# Patient Record
Sex: FEMALE | Race: WHITE | NOT HISPANIC OR LATINO | Employment: FULL TIME | URBAN - METROPOLITAN AREA
[De-identification: names, ages, dates, MRNs, and addresses within clinical notes are randomized per-mention and may not be internally consistent; named-entity substitution may affect disease eponyms.]

---

## 2018-08-27 ENCOUNTER — TRANSCRIBE ORDERS (OUTPATIENT)
Dept: PHYSICAL THERAPY | Facility: CLINIC | Age: 48
End: 2018-08-27

## 2018-08-27 DIAGNOSIS — M54.5 LOW BACK PAIN, UNSPECIFIED BACK PAIN LATERALITY, UNSPECIFIED CHRONICITY, WITH SCIATICA PRESENCE UNSPECIFIED: Primary | ICD-10-CM

## 2018-09-06 ENCOUNTER — TREATMENT (OUTPATIENT)
Dept: PHYSICAL THERAPY | Facility: CLINIC | Age: 48
End: 2018-09-06

## 2018-09-06 DIAGNOSIS — S39.012D STRAIN OF LUMBAR REGION, SUBSEQUENT ENCOUNTER: Primary | ICD-10-CM

## 2018-09-06 PROCEDURE — 97110 THERAPEUTIC EXERCISES: CPT | Performed by: PHYSICAL THERAPIST

## 2018-09-06 PROCEDURE — 97161 PT EVAL LOW COMPLEX 20 MIN: CPT | Performed by: PHYSICAL THERAPIST

## 2018-09-06 PROCEDURE — 97112 NEUROMUSCULAR REEDUCATION: CPT | Performed by: PHYSICAL THERAPIST

## 2018-09-06 NOTE — PROGRESS NOTES
Physical Therapy Initial Evaluation and Plan of Care        Subjective Evaluation    History of Present Illness  Date of onset: 8/15/2018  Mechanism of injury: Patient was pulling a can.      Patient Occupation: [employer]   Precautions and Work Restrictions: as toleratedPain  Current pain ratin  At worst pain ratin  Location: bilateral lumbar spine to thighs  Quality: sharp and dull ache (stiffness, pressure)  Relieving factors: rest and ice  Exacerbated by: twisting, reaching out.  Progression: improved             Objective       Palpation     Additional Palpation Details  No TTP to the lumbar spine    Active Range of Motion     Lumbar   Flexion: 81 degrees   Extension: 13 degrees   Left lateral flexion: 25 degrees   Right lateral flexion: 25 degrees     Tests     Additional Tests Details  - PPU  + SLR on the left for LBP  - GWEN         Assessment & Plan     Assessment  Impairments: abnormal or restricted ROM, activity intolerance, lacks appropriate home exercise program and pain with function  Assessment details: Patient presents with c/o pain, limited AROM and positive special testing which is limiting her ability to perform full job duties and ADL'S.  Barriers to therapy: none  Prognosis: good  Prognosis details: STG's to be met by 2 weeks  1)  Independent with HEP  2)  Decrease pain by 50% or more  3)  AROM WNL for the lumbar spine      LTG's to be met by 4 weeks  1)  Independent with HEP progression  2)  Decrease pain by 75% or more  3)  Negative special testing  4)  Patient to perform ADL'S such as vacuuming, cleaning, etc without increase lumbar pain  6)  Patient to push/pull up to 100 lbs without c/o      Plan  Therapy options: will be seen for skilled physical therapy services  Planned therapy interventions: strengthening, stretching, therapeutic activities, home exercise program and neuromuscular re-education  Duration in visits: 12  Treatment plan discussed with: patient        Manual Therapy:    0      mins  82239;  Therapeutic Exercise:    13     mins  47212;     Neuromuscular Caridad:    8    mins  51001;    Therapeutic Activity:     0     mins  67573;     Gait Trainin     mins  37088;     Ultrasound:     0     mins  58817;    Work Hardening           0      mins 03578  Iontophoresis               0   mins 10065    Timed Treatment:   21   mins   Total Treatment:     35   mins    PT SIGNATURE: Jose BASURTOVeena Dmitry, PT   DATE TREATMENT INITIATED: 2018    Initial Certification  Certification Period: 2018  I certify that the therapy services are furnished while this patient is under my care.  The services outlined above are required by this patient, and will be reviewed every 90 days.     PHYSICIAN: Sangeeta Granado, ROBB      DATE:     Please sign and return via fax to [phone].. Thank you, [facility].

## 2018-09-07 ENCOUNTER — TREATMENT (OUTPATIENT)
Dept: PHYSICAL THERAPY | Facility: CLINIC | Age: 48
End: 2018-09-07

## 2018-09-07 DIAGNOSIS — S39.012D STRAIN OF LUMBAR REGION, SUBSEQUENT ENCOUNTER: Primary | ICD-10-CM

## 2018-09-07 PROCEDURE — 97110 THERAPEUTIC EXERCISES: CPT | Performed by: PHYSICAL THERAPIST

## 2018-09-07 PROCEDURE — 97112 NEUROMUSCULAR REEDUCATION: CPT | Performed by: PHYSICAL THERAPIST

## 2018-09-07 NOTE — PROGRESS NOTES
Physical Therapy Daily Progress Note           Subjective  Patient reports a catch in the tailbone.    Objective   See Exercise, Manual, and Modality Logs for complete treatment.       Assessment/Plan  Patient tolerated the progression of ROM and initiation of strengthening exercises without c/o pain in the lumbar spine.  Added ROM exercises for the thoracic spine today to improve the mobility there.  Plan to progress the core stabilization activities as she can tolerate.                     Manual Therapy:    0     mins  24900;  Therapeutic Exercise:    24     mins  63662;     Neuromuscular Caridad:    8    mins  21885;    Therapeutic Activity:     0     mins  28007;     Gait Trainin     mins  02671;     Ultrasound:     0     mins  93965;    Work Hardening           0      mins 63348  Iontophoresis               0   mins 13678    Timed Treatment:   32   mins   Total Treatment:     32   mins    Jose Andres, PT  Physical Therapist

## 2018-09-27 ENCOUNTER — DOCUMENTATION (OUTPATIENT)
Dept: PHYSICAL THERAPY | Facility: CLINIC | Age: 48
End: 2018-09-27

## 2018-09-27 NOTE — PROGRESS NOTES
Discharge Summary  Discharge Summary from Physical Therapy Report      Dates  PT visit: 9/6 and 9/7/18  Number of Visits: 3     Discharge Status of Patient: See last daily note.    Goals: Partially Met    Discharge Plan: Continue with current home exercise program as instructed    Comments Patient did not return to PT.    Date of Discharge 9/27/18        Jose Andres, PT  Physical Therapist